# Patient Record
Sex: FEMALE | Race: WHITE | HISPANIC OR LATINO | URBAN - METROPOLITAN AREA
[De-identification: names, ages, dates, MRNs, and addresses within clinical notes are randomized per-mention and may not be internally consistent; named-entity substitution may affect disease eponyms.]

---

## 2017-12-24 ENCOUNTER — EMERGENCY (EMERGENCY)
Facility: HOSPITAL | Age: 32
LOS: 1 days | Discharge: HOME | End: 2017-12-24
Attending: EMERGENCY MEDICINE | Admitting: EMERGENCY MEDICINE
Payer: COMMERCIAL

## 2017-12-24 VITALS
SYSTOLIC BLOOD PRESSURE: 104 MMHG | HEART RATE: 83 BPM | RESPIRATION RATE: 18 BRPM | TEMPERATURE: 98 F | DIASTOLIC BLOOD PRESSURE: 70 MMHG | OXYGEN SATURATION: 98 %

## 2017-12-24 VITALS
DIASTOLIC BLOOD PRESSURE: 76 MMHG | HEART RATE: 74 BPM | TEMPERATURE: 98 F | RESPIRATION RATE: 18 BRPM | SYSTOLIC BLOOD PRESSURE: 108 MMHG | OXYGEN SATURATION: 100 %

## 2017-12-24 DIAGNOSIS — Z3A.01 LESS THAN 8 WEEKS GESTATION OF PREGNANCY: ICD-10-CM

## 2017-12-24 DIAGNOSIS — O20.9 HEMORRHAGE IN EARLY PREGNANCY, UNSPECIFIED: ICD-10-CM

## 2017-12-24 LAB
ALBUMIN SERPL ELPH-MCNC: 4.1 G/DL — SIGNIFICANT CHANGE UP (ref 3.3–5)
ALP SERPL-CCNC: 73 U/L — SIGNIFICANT CHANGE UP (ref 40–120)
ALT FLD-CCNC: 31 U/L — SIGNIFICANT CHANGE UP (ref 10–45)
ANION GAP SERPL CALC-SCNC: 16 MMOL/L — SIGNIFICANT CHANGE UP (ref 5–17)
APPEARANCE UR: CLEAR — SIGNIFICANT CHANGE UP
AST SERPL-CCNC: 23 U/L — SIGNIFICANT CHANGE UP (ref 10–40)
BACTERIA # UR AUTO: PRESENT /HPF
BASOPHILS NFR BLD AUTO: 0.3 % — SIGNIFICANT CHANGE UP (ref 0–2)
BILIRUB SERPL-MCNC: 0.2 MG/DL — SIGNIFICANT CHANGE UP (ref 0.2–1.2)
BILIRUB UR-MCNC: NEGATIVE — SIGNIFICANT CHANGE UP
BLD GP AB SCN SERPL QL: NEGATIVE — SIGNIFICANT CHANGE UP
BUN SERPL-MCNC: 8 MG/DL — SIGNIFICANT CHANGE UP (ref 7–23)
CALCIUM SERPL-MCNC: 9 MG/DL — SIGNIFICANT CHANGE UP (ref 8.4–10.5)
CHLORIDE SERPL-SCNC: 102 MMOL/L — SIGNIFICANT CHANGE UP (ref 96–108)
CO2 SERPL-SCNC: 20 MMOL/L — LOW (ref 22–31)
COD CRY URNS QL: (no result) /HPF
COLOR SPEC: YELLOW — SIGNIFICANT CHANGE UP
CREAT SERPL-MCNC: 0.41 MG/DL — LOW (ref 0.5–1.3)
DIFF PNL FLD: (no result)
EOSINOPHIL NFR BLD AUTO: 1.9 % — SIGNIFICANT CHANGE UP (ref 0–6)
GLUCOSE SERPL-MCNC: 136 MG/DL — HIGH (ref 70–99)
GLUCOSE UR QL: NEGATIVE — SIGNIFICANT CHANGE UP
HCT VFR BLD CALC: 37.6 % — SIGNIFICANT CHANGE UP (ref 34.5–45)
HGB BLD-MCNC: 12.5 G/DL — SIGNIFICANT CHANGE UP (ref 11.5–15.5)
KETONES UR-MCNC: NEGATIVE — SIGNIFICANT CHANGE UP
LEUKOCYTE ESTERASE UR-ACNC: NEGATIVE — SIGNIFICANT CHANGE UP
LYMPHOCYTES # BLD AUTO: 24.9 % — SIGNIFICANT CHANGE UP (ref 13–44)
MCHC RBC-ENTMCNC: 29.1 PG — SIGNIFICANT CHANGE UP (ref 27–34)
MCHC RBC-ENTMCNC: 33.2 G/DL — SIGNIFICANT CHANGE UP (ref 32–36)
MCV RBC AUTO: 87.4 FL — SIGNIFICANT CHANGE UP (ref 80–100)
MONOCYTES NFR BLD AUTO: 8.7 % — SIGNIFICANT CHANGE UP (ref 2–14)
NEUTROPHILS NFR BLD AUTO: 64.2 % — SIGNIFICANT CHANGE UP (ref 43–77)
NITRITE UR-MCNC: NEGATIVE — SIGNIFICANT CHANGE UP
PH UR: 6 — SIGNIFICANT CHANGE UP (ref 5–8)
PLATELET # BLD AUTO: 271 K/UL — SIGNIFICANT CHANGE UP (ref 150–400)
POTASSIUM SERPL-MCNC: 3.8 MMOL/L — SIGNIFICANT CHANGE UP (ref 3.5–5.3)
POTASSIUM SERPL-SCNC: 3.8 MMOL/L — SIGNIFICANT CHANGE UP (ref 3.5–5.3)
PROT SERPL-MCNC: 7.1 G/DL — SIGNIFICANT CHANGE UP (ref 6–8.3)
PROT UR-MCNC: NEGATIVE MG/DL — SIGNIFICANT CHANGE UP
RBC # BLD: 4.3 M/UL — SIGNIFICANT CHANGE UP (ref 3.8–5.2)
RBC # FLD: 12.2 % — SIGNIFICANT CHANGE UP (ref 10.3–16.9)
RBC CASTS # UR COMP ASSIST: (no result) /HPF
RH IG SCN BLD-IMP: POSITIVE — SIGNIFICANT CHANGE UP
SODIUM SERPL-SCNC: 138 MMOL/L — SIGNIFICANT CHANGE UP (ref 135–145)
SP GR SPEC: 1.02 — SIGNIFICANT CHANGE UP (ref 1–1.03)
UROBILINOGEN FLD QL: 0.2 E.U./DL — SIGNIFICANT CHANGE UP
WBC # BLD: 7.9 K/UL — SIGNIFICANT CHANGE UP (ref 3.8–10.5)
WBC # FLD AUTO: 7.9 K/UL — SIGNIFICANT CHANGE UP (ref 3.8–10.5)
WBC UR QL: < 5 /HPF — SIGNIFICANT CHANGE UP

## 2017-12-24 PROCEDURE — 86901 BLOOD TYPING SEROLOGIC RH(D): CPT

## 2017-12-24 PROCEDURE — 85025 COMPLETE CBC W/AUTO DIFF WBC: CPT

## 2017-12-24 PROCEDURE — 86850 RBC ANTIBODY SCREEN: CPT

## 2017-12-24 PROCEDURE — 99284 EMERGENCY DEPT VISIT MOD MDM: CPT | Mod: 25

## 2017-12-24 PROCEDURE — 86900 BLOOD TYPING SEROLOGIC ABO: CPT

## 2017-12-24 PROCEDURE — 99285 EMERGENCY DEPT VISIT HI MDM: CPT

## 2017-12-24 PROCEDURE — 80053 COMPREHEN METABOLIC PANEL: CPT

## 2017-12-24 PROCEDURE — 81001 URINALYSIS AUTO W/SCOPE: CPT

## 2017-12-24 PROCEDURE — 84702 CHORIONIC GONADOTROPIN TEST: CPT

## 2017-12-24 PROCEDURE — 87086 URINE CULTURE/COLONY COUNT: CPT

## 2017-12-24 RX ORDER — PREGABALIN 225 MG/1
0 CAPSULE ORAL
Qty: 0 | Refills: 0 | COMMUNITY

## 2017-12-24 RX ORDER — FOLIC ACID 0.8 MG
0 TABLET ORAL
Qty: 0 | Refills: 0 | COMMUNITY

## 2017-12-24 RX ORDER — CHOLECALCIFEROL (VITAMIN D3) 125 MCG
0 CAPSULE ORAL
Qty: 0 | Refills: 0 | COMMUNITY

## 2017-12-24 RX ORDER — SODIUM CHLORIDE 9 MG/ML
1000 INJECTION INTRAMUSCULAR; INTRAVENOUS; SUBCUTANEOUS
Qty: 0 | Refills: 0 | Status: DISCONTINUED | OUTPATIENT
Start: 2017-12-24 | End: 2017-12-28

## 2017-12-24 RX ADMIN — SODIUM CHLORIDE 125 MILLILITER(S): 9 INJECTION INTRAMUSCULAR; INTRAVENOUS; SUBCUTANEOUS at 20:55

## 2017-12-24 NOTE — ED PROVIDER NOTE - MEDICAL DECISION MAKING DETAILS
31 yo F   vag bleeding clear fluid after sexual IC 1 hr ago - abd soft  NT-  await U/S beta  had nl U/S 2 weeks earlier

## 2017-12-24 NOTE — ED ADULT NURSE NOTE - CHPI ED SYMPTOMS NEG
no nausea/no burning urination/no vomiting/no blood in stool/no dysuria/no hematuria/no diarrhea/no fever/no abdominal distension/no chills

## 2017-12-24 NOTE — ED PROVIDER NOTE - PROGRESS NOTE DETAILS
GYN did a bedside U/S in ED- canceled formal sono-- noted pos IUP with FHT noted--  RH pos-  no active bleeding- observed  x 1 hr in ED dw GYN  stable for dc--

## 2017-12-24 NOTE — ED ADULT NURSE NOTE - OBJECTIVE STATEMENT
Patient c/o of intermittent abdominal cramping pain 6/10 w/ bright vaginal bleed, moderate amount, w/ dizziness and feeling tired, no nausea/vomitting.

## 2017-12-24 NOTE — ED PROVIDER NOTE - OBJECTIVE STATEMENT
33 yo F  7 weeks preg - last U/S was 2 weeks ago (was nl) states she developed vag bleeding - small amount as well as " clear fluid" after having sexual intercourse 1 hr ago - mild abd cramping  no fevers or chills no flank pain no N/V - Dr Adan is her OB

## 2017-12-24 NOTE — CONSULT NOTE ADULT - SUBJECTIVE AND OBJECTIVE BOX
32y  @ about 7w by LMP (late October- pt can not remember specfics, and limited english proficiency) presenting for vaginal bleeding after intercourse today. Pt states she has had an uncomplicated pregnancy thus far and sees an OBGYN at [facility]. Her and her significant other had intercourse and shortly after she started to bleed. States she had some mild cramping also. Denies passing any clots. Denies n/v, f/c. Pt states she wanted to make sure everything is okay    Last Menstrual Period: End of october     OBHx: first pregnancy  GYNHx: Denies STDs, abnormal paps  No significant past surgical history  PMH: recently diagnosed with thyroid cancer about a month ago. Seeing oncologist at [facility]. Plans for surgery in 2nd trimester  No Known Allergies  PNV, Folate      PHYSICAL EXAM:   Vital Signs Last 24 Hrs  T(C): 36.9 (24 Dec 2017 20:10), Max: 36.9 (24 Dec 2017 20:10)  T(F): 98.4 (24 Dec 2017 20:10), Max: 98.4 (24 Dec 2017 20:10)  HR: 83 (24 Dec 2017 20:10) (83 - 83)  BP: 104/70 (24 Dec 2017 20:10) (104/70 - 104/70)  BP(mean): --  RR: 18 (24 Dec 2017 20:10) (18 - 18)  SpO2: 98% (24 Dec 2017 20:10) (98% - 98%)    **************************  Constitutional: Alert & Oriented x3, No acute distress, cooperative   Respiratory: Clear to ausculation bilaterally; no wheezing, rhonchi, or crackles  Cardiovascular: S1 &S2 physiologic, no murmurs, or gallops  Gastrointestinal: soft, non tender, positive bowel sounds, no rebound or guarding   Speculum exam: closed cervix, no active bleeding but about 5cc BRB mixed with fluid seen in vagina  Extremities: no calf tenderness or swelling      LABS:                        12.5   7.9   )-----------( 271      ( 24 Dec 2017 20:49 )             37.6     12-    138  |  102  |  8   ----------------------------<  136<H>  3.8   |  20<L>  |  0.41<L>    Ca    9.0      24 Dec 2017 20:49    TPro  7.1  /  Alb  4.1  /  TBili  0.2  /  DBili  x   /  AST  23  /  ALT  31  /  AlkPhos  73  12-24      Urinalysis Basic - ( 24 Dec 2017 21:03 )    Color: Yellow / Appearance: Clear / S.025 / pH: x  Gluc: x / Ketone: NEGATIVE  / Bili: Negative / Urobili: 0.2 E.U./dL   Blood: x / Protein: NEGATIVE mg/dL / Nitrite: NEGATIVE   Leuk Esterase: NEGATIVE / RBC: 5-10 /HPF / WBC < 5 /HPF   Sq Epi: x / Non Sq Epi: x / Bacteria: Present /HPF        RADIOLOGY & ADDITIONAL STUDIES:  Bedside u/s by resident: Normal fluid. +FM, +FH

## 2017-12-24 NOTE — ED ADULT NURSE REASSESSMENT NOTE - NS ED NURSE REASSESS COMMENT FT1
Patient a/o X 3, bedside US completed, seen by OB GYNE.  Vital signs stable, no pain or other symptom complaints.  Discharged to home in stable condition.

## 2017-12-24 NOTE — CONSULT NOTE ADULT - ASSESSMENT
32y G10 @ about 7w presenting with vaginal bleeding s/p intercourse  - +FH with reassuring fluid and movement on u/s  - No active bleeding seen  - Bleeding most likely due to trauma, pt also indicates she did spin class this am  - Instructed pt to relax and abstain from intercourse this week  - she is to follow up with OBGYN this week  - should recieve Rhogam if Rh negative, pending Rh status    d/w Dr. Dang

## 2017-12-24 NOTE — ED ADULT NURSE REASSESSMENT NOTE - NS ED NURSE REASSESS COMMENT FT1
Patient a/oX 3, anxious, c/o of intermittent abdominal cramping pain 7/10 w/ moderate vaginal bleed, no blood clots, states bright red blood, w/ dizziness and fatigue, no nausea/vomititng.  Vital signs stable.  Left AC PIV #20 in place, all labs sent, no complications.  NSS ongoing 125ml/hr.  Pelvic exam done by OB GYNE.  To be brought to US in stable condition.

## 2017-12-26 LAB
CULTURE RESULTS: NO GROWTH — SIGNIFICANT CHANGE UP
SPECIMEN SOURCE: SIGNIFICANT CHANGE UP

## 2018-03-24 ENCOUNTER — EMERGENCY (EMERGENCY)
Facility: HOSPITAL | Age: 33
LOS: 1 days | Discharge: HOME | End: 2018-03-24
Attending: EMERGENCY MEDICINE | Admitting: EMERGENCY MEDICINE
Payer: COMMERCIAL

## 2018-03-24 VITALS
OXYGEN SATURATION: 98 % | SYSTOLIC BLOOD PRESSURE: 95 MMHG | TEMPERATURE: 98 F | HEART RATE: 90 BPM | WEIGHT: 115.96 LBS | RESPIRATION RATE: 18 BRPM | DIASTOLIC BLOOD PRESSURE: 67 MMHG

## 2018-03-24 VITALS
SYSTOLIC BLOOD PRESSURE: 101 MMHG | DIASTOLIC BLOOD PRESSURE: 62 MMHG | RESPIRATION RATE: 17 BRPM | TEMPERATURE: 98 F | OXYGEN SATURATION: 97 % | HEART RATE: 67 BPM

## 2018-03-24 DIAGNOSIS — N93.9 ABNORMAL UTERINE AND VAGINAL BLEEDING, UNSPECIFIED: ICD-10-CM

## 2018-03-24 DIAGNOSIS — R10.2 PELVIC AND PERINEAL PAIN: ICD-10-CM

## 2018-03-24 DIAGNOSIS — Z98.890 OTHER SPECIFIED POSTPROCEDURAL STATES: Chronic | ICD-10-CM

## 2018-03-24 DIAGNOSIS — Z79.899 OTHER LONG TERM (CURRENT) DRUG THERAPY: ICD-10-CM

## 2018-03-24 LAB
ALBUMIN SERPL ELPH-MCNC: 4.2 G/DL — SIGNIFICANT CHANGE UP (ref 3.3–5)
ALP SERPL-CCNC: 75 U/L — SIGNIFICANT CHANGE UP (ref 40–120)
ALT FLD-CCNC: 31 U/L — SIGNIFICANT CHANGE UP (ref 10–45)
ANION GAP SERPL CALC-SCNC: 15 MMOL/L — SIGNIFICANT CHANGE UP (ref 5–17)
AST SERPL-CCNC: 32 U/L — SIGNIFICANT CHANGE UP (ref 10–40)
BASOPHILS NFR BLD AUTO: 0.6 % — SIGNIFICANT CHANGE UP (ref 0–2)
BILIRUB SERPL-MCNC: 0.3 MG/DL — SIGNIFICANT CHANGE UP (ref 0.2–1.2)
BUN SERPL-MCNC: 14 MG/DL — SIGNIFICANT CHANGE UP (ref 7–23)
CALCIUM SERPL-MCNC: 9.5 MG/DL — SIGNIFICANT CHANGE UP (ref 8.4–10.5)
CHLORIDE SERPL-SCNC: 100 MMOL/L — SIGNIFICANT CHANGE UP (ref 96–108)
CO2 SERPL-SCNC: 24 MMOL/L — SIGNIFICANT CHANGE UP (ref 22–31)
CREAT SERPL-MCNC: 0.56 MG/DL — SIGNIFICANT CHANGE UP (ref 0.5–1.3)
EOSINOPHIL NFR BLD AUTO: 2.3 % — SIGNIFICANT CHANGE UP (ref 0–6)
GLUCOSE SERPL-MCNC: 91 MG/DL — SIGNIFICANT CHANGE UP (ref 70–99)
HCG SERPL-ACNC: 9.8 MIU/ML — HIGH
HCT VFR BLD CALC: 34.5 % — SIGNIFICANT CHANGE UP (ref 34.5–45)
HGB BLD-MCNC: 11.3 G/DL — LOW (ref 11.5–15.5)
LYMPHOCYTES # BLD AUTO: 37 % — SIGNIFICANT CHANGE UP (ref 13–44)
MCHC RBC-ENTMCNC: 30.7 PG — SIGNIFICANT CHANGE UP (ref 27–34)
MCHC RBC-ENTMCNC: 32.8 G/DL — SIGNIFICANT CHANGE UP (ref 32–36)
MCV RBC AUTO: 93.8 FL — SIGNIFICANT CHANGE UP (ref 80–100)
MONOCYTES NFR BLD AUTO: 7.1 % — SIGNIFICANT CHANGE UP (ref 2–14)
NEUTROPHILS NFR BLD AUTO: 53 % — SIGNIFICANT CHANGE UP (ref 43–77)
PLATELET # BLD AUTO: 262 K/UL — SIGNIFICANT CHANGE UP (ref 150–400)
POTASSIUM SERPL-MCNC: 4.2 MMOL/L — SIGNIFICANT CHANGE UP (ref 3.5–5.3)
POTASSIUM SERPL-SCNC: 4.2 MMOL/L — SIGNIFICANT CHANGE UP (ref 3.5–5.3)
PROT SERPL-MCNC: 7 G/DL — SIGNIFICANT CHANGE UP (ref 6–8.3)
RBC # BLD: 3.68 M/UL — LOW (ref 3.8–5.2)
RBC # FLD: 12.7 % — SIGNIFICANT CHANGE UP (ref 10.3–16.9)
SODIUM SERPL-SCNC: 139 MMOL/L — SIGNIFICANT CHANGE UP (ref 135–145)
WBC # BLD: 8.8 K/UL — SIGNIFICANT CHANGE UP (ref 3.8–10.5)
WBC # FLD AUTO: 8.8 K/UL — SIGNIFICANT CHANGE UP (ref 3.8–10.5)

## 2018-03-24 PROCEDURE — 85025 COMPLETE CBC W/AUTO DIFF WBC: CPT

## 2018-03-24 PROCEDURE — 84702 CHORIONIC GONADOTROPIN TEST: CPT

## 2018-03-24 PROCEDURE — 99284 EMERGENCY DEPT VISIT MOD MDM: CPT | Mod: 25

## 2018-03-24 PROCEDURE — 99284 EMERGENCY DEPT VISIT MOD MDM: CPT

## 2018-03-24 PROCEDURE — 36415 COLL VENOUS BLD VENIPUNCTURE: CPT

## 2018-03-24 PROCEDURE — 80053 COMPREHEN METABOLIC PANEL: CPT

## 2018-03-24 RX ORDER — OXYCODONE AND ACETAMINOPHEN 5; 325 MG/1; MG/1
1 TABLET ORAL ONCE
Qty: 0 | Refills: 0 | Status: DISCONTINUED | OUTPATIENT
Start: 2018-03-24 | End: 2018-03-24

## 2018-03-24 RX ORDER — SODIUM CHLORIDE 9 MG/ML
1000 INJECTION INTRAMUSCULAR; INTRAVENOUS; SUBCUTANEOUS ONCE
Qty: 0 | Refills: 0 | Status: COMPLETED | OUTPATIENT
Start: 2018-03-24 | End: 2018-03-24

## 2018-03-24 RX ADMIN — SODIUM CHLORIDE 1500 MILLILITER(S): 9 INJECTION INTRAMUSCULAR; INTRAVENOUS; SUBCUTANEOUS at 22:29

## 2018-03-24 RX ADMIN — OXYCODONE AND ACETAMINOPHEN 1 TABLET(S): 5; 325 TABLET ORAL at 22:29

## 2018-03-24 NOTE — ED PROVIDER NOTE - OBJECTIVE STATEMENT
34 y/o f with h/o thyroid cancer with thyroid surgery on 2/26/2018. She recent had a TOP on 2/2/2018 at 14-15 weeks due baby not developing 34 y/o f with h/o thyroid cancer with thyroid surgery on 2/26/2018. She recent had a TOP on 2/2/2018 at 14-15 weeks due to baby not developing normally. Patient  state of having a procedure done at [place]. She had her menstrual cycle 3/12/2018. Report of being exam after TOP and cleared. Today while having intercourse a hour ago began to have heavy vaginal bleeding. Admit to feel weak and light headed. DEnies sob, chest pain. dysuria.

## 2018-03-24 NOTE — ED PROVIDER NOTE - MEDICAL DECISION MAKING DETAILS
Patient with vaginal bleeding hemodynamically stable , beta 9.8 ,  s/p TOP gyn consulted. Pending gyn disposition.

## 2018-03-24 NOTE — CONSULT NOTE ADULT - ASSESSMENT
32y G10 s/p TOP D&C presenting with vaginal bleeding s/p intercourse  - Most likely due to first menstrual cycle and shedding of endometrial lining  - TAUS consistnat with above diagnosis. no concern for retained POC due to PE  - VSS, Hg stable  - she is to follow up with OBGYN this week  - Please send home with PO Iron     d/w Dr. Mccarty

## 2018-03-24 NOTE — ED ADULT TRIAGE NOTE - CHIEF COMPLAINT QUOTE
vag bleed started today; with lower abd cramps; S/P   , LMP 3/17 vag bleed started today; with lower abd cramps; S/P   , LMP 3/12

## 2018-03-24 NOTE — CONSULT NOTE ADULT - SUBJECTIVE AND OBJECTIVE BOX
32y  s/p VTOP D&C for fetal abnormalities presents for vaginal bleeding after intercourse today. Pt states she has had an uncomplicated TOP on 2018 at 14-15 weeks. Report of being exam after TOP and cleared of all products. Patient  state of having a procedure done at [place]. She had her menstrual cycle 3/12/2018. She then had intercourse today and started passing clots. States she had some mild cramping also. Denies n/v, f/c, SOB or weakness.     Last Menstrual Period: 3/12     OBHx: G1: VTOP D&C see above  GYNHx: Denies STDs, abnormal paps  PSH: total thyroidectomy 2018  No Known Allergies        PHYSICAL EXAM:   Vital Signs Last 24 Hrs  T(C): 36.8 (24 Mar 2018 21:16), Max: 36.8 (24 Mar 2018 21:16)  T(F): 98.2 (24 Mar 2018 21:16), Max: 98.2 (24 Mar 2018 21:16)  HR: 90 (24 Mar 2018 21:16) (90 - 90)  BP: 95/67 (24 Mar 2018 21:16) (95/67 - 95/67)  BP(mean): --  RR: 18 (24 Mar 2018 21:16) (18 - 18)  SpO2: 98% (24 Mar 2018 21:16) (98% - 98%)    **************************  Constitutional: Alert & Oriented x3, No acute distress, cooperative   Respiratory: Clear to ausculation bilaterally; no wheezing, rhonchi, or crackles  Cardiovascular: S1 &S2 physiologic, no murmurs, or gallops  Gastrointestinal: soft, non tender, positive bowel sounds, no rebound or guarding   Speculum exam: closed cervix, no active bleeding but about 5cc BRB  Pelvic: no cmt, no adnexal masses  Extremities: no calf tenderness or swelling      LABS:                          11.3   8.8    )----------(  262       ( 24 Mar 2018 21:32 )               34.5      139    |  100    |  14     ----------------------------<  91         ( 24 Mar 2018 21:32 )  4.2     |  24     |  0.56     Ca    9.5        ( 24 Mar 2018 21:32 )    TPro  7.0    /  Alb  4.2    /  TBili  0.3    /  DBili  x      /  AST  32     /  ALT  31     /  AlkPhos  75     ( 24 Mar 2018 21:32 )    LIVER FUNCTIONS - ( 24 Mar 2018 21:32 )  Alb: 4.2 g/dL / Pro: 7.0 g/dL / ALK PHOS: 75 U/L / ALT: 31 U/L / AST: 32 U/L / GGT: x               CAPILLARY BLOOD GLUCOSE          RADIOLOGY & ADDITIONAL STUDIES:  Bedside u/s by resident: 0.2cm lining, no products in uterus, NL ovaries

## 2018-03-24 NOTE — ED PROVIDER NOTE - ATTENDING CONTRIBUTION TO CARE
I have seen and examined the pt, and I agree with the documentation/care/plan executed by ZEV Patterson.

## 2023-01-30 NOTE — ED ADULT NURSE NOTE - BREATHING, MLM
I have reviewed discharge instructions with the parent. The parent verbalized understanding. Spontaneous, unlabored and symmetrical